# Patient Record
Sex: MALE | Race: WHITE | URBAN - METROPOLITAN AREA
[De-identification: names, ages, dates, MRNs, and addresses within clinical notes are randomized per-mention and may not be internally consistent; named-entity substitution may affect disease eponyms.]

---

## 2017-01-30 ENCOUNTER — TELEPHONE (OUTPATIENT)
Dept: CARDIOLOGY CLINIC | Age: 57
End: 2017-01-30

## 2017-01-30 NOTE — TELEPHONE ENCOUNTER
Called patient to verify [facility] he went to. He went to the one in [place].  Faxed them STAT records request.     Future Appointments  Date Time Provider Lane [place]   2/3/2017 2:40 PM Moises Franz  [address]      Received ekg and vitals

## 2017-02-03 ENCOUNTER — OFFICE VISIT (OUTPATIENT)
Dept: CARDIOLOGY CLINIC | Age: 57
End: 2017-02-03

## 2017-02-03 VITALS
HEIGHT: 70 IN | RESPIRATION RATE: 12 BRPM | OXYGEN SATURATION: 98 % | SYSTOLIC BLOOD PRESSURE: 140 MMHG | WEIGHT: 158.6 LBS | HEART RATE: 64 BPM | BODY MASS INDEX: 22.71 KG/M2 | DIASTOLIC BLOOD PRESSURE: 70 MMHG

## 2017-02-03 DIAGNOSIS — R03.0 PRE-HYPERTENSION: Primary | ICD-10-CM

## 2017-02-03 DIAGNOSIS — R07.89 OTHER CHEST PAIN: ICD-10-CM

## 2017-02-03 DIAGNOSIS — R00.1 BRADYCARDIA: ICD-10-CM

## 2017-02-03 PROBLEM — R07.9 CHEST PAIN: Status: ACTIVE | Noted: 2017-02-03

## 2017-02-03 RX ORDER — ASPIRIN 81 MG/1
TABLET ORAL DAILY
COMMUNITY

## 2017-02-03 RX ORDER — OMEPRAZOLE 20 MG/1
20 CAPSULE, DELAYED RELEASE ORAL DAILY
COMMUNITY

## 2017-02-03 NOTE — PROGRESS NOTES
HISTORY OF PRESENT ILLNESS  Mauricio Joy is a 56 y.o. male     SUMMARY:   Problem List  Date Reviewed: 2/3/2017          Codes Class Noted    Chest pain ICD-10-CM: R07.9  ICD-9-CM: 786.50  2/3/2017        Bradycardia ICD-10-CM: R00.1  ICD-9-CM: 427.89  2/3/2017        Pre-hypertension ICD-10-CM: R03.0  ICD-9-CM: 796.2  2/3/2017              No current outpatient prescriptions on file prior to visit. No current facility-administered medications on file prior to visit. CARDIOLOGY STUDIES TO DATE:  none  Chief Complaint   Patient presents with    Chest Pain (Angina)     patient 1st last week. States shoulder and left hand pain. Denies further chest pain/shortness of breath/swelling.  Slow Heart Rate     HPI :  Mr. Lizzy Hurst is a 56year old referred from [facility] for cardiac evaluation. On last Wednesday he was sitting at his desk at work when he had the sudden onset of left shoulder pain followed by some numbness in his left hand, all of which lasted less than five minutes, but he got concerned enough that he drove himself over to [facility] where he had a negative evaluation. His EKG at that point showed sinus bradycardia but was otherwise normal. He did not notice any other symptoms at the time of this occurrence, and he has never had anything like this before, though he has noticed that occasionally when he tries to open a jar with his right hand he will get some shooting pain in his hand and up his right forearm. He is active but gets no regular exercise. There is no history of hypertension or diabetes, he says his cholesterol has been okay, he has never smoked, and family history is negative for premature coronary disease. CARDIAC ROS:   negative for dyspnea, palpitations, syncope, orthopnea, paroxysmal nocturnal dyspnea, exertional chest pressure/discomfort, claudication, lower extremity edema    No family history on file.     No past medical history on file.    GENERAL ROS:  A comprehensive review of systems was negative except for sleep disturbance    Visit Vitals    /70 (BP 1 Location: Right arm, BP Patient Position: Sitting)    Pulse 64    Resp 12    Ht 5' 10\" (1.778 m)    Wt 158 lb 9.6 oz (71.9 kg)    SpO2 98%    BMI 22.76 kg/m2       Wt Readings from Last 3 Encounters:   02/03/17 158 lb 9.6 oz (71.9 kg)            BP Readings from Last 3 Encounters:   02/03/17 140/70       PHYSICAL EXAM  General appearance: alert, cooperative, no distress, appears stated age  Neurologic: Alert and oriented X 3  Neck: supple, symmetrical, trachea midline, no adenopathy, no carotid bruit and no JVD  Lungs: clear to auscultation bilaterally  Heart: regular rate and rhythm, S1, S2 normal, no murmur, click, rub or gallop  Abdomen: soft, non-tender. Bowel sounds normal. No masses,  no organomegaly  Extremities: extremities normal, atraumatic, no cyanosis or edema  Pulses: 2+ and symmetric      ASSESSMENT  Mr. Melodie Perry symptoms are not consistent with a cardiac etiology, and he and I talked about that at great length. I suspect it is some sort of nerve impingement of some sort. He has really no cardiac risk factors, his exam is normal, his EKG is normal, and at this point he needs no further cardiac evaluation. I strongly encouraged him to start a light exercise program for a number of different reasons. We talked about symptoms that would prompt an urgent return visit here or a call to 911. He is due to see his primary care in the next month or so for a complete physical including a lipid profile, and I told him he could forward that to us if he wished, and we would take a look at it.        current treatment plan is effective, no change in therapy  lab results and schedule of future lab studies reviewed with patient  reviewed diet, exercise and weight control    Encounter Diagnoses   Name Primary?     Pre-hypertension Yes    Other chest pain     Bradycardia Orders Placed This Encounter    omeprazole (PRILOSEC) 20 mg capsule    aspirin delayed-release 81 mg tablet       Follow-up Disposition:  Return if symptoms worsen or fail to improve.     Bre Eng MD  2/3/2017

## 2017-02-03 NOTE — MR AVS SNAPSHOT
Visit Information Date & Time Provider Department Dept. Phone Encounter #  
 2/3/2017  2:40 PM Rocael Theodore MD [facility] [place] [phone] [id number] Follow-up Instructions Return if symptoms worsen or fail to improve. Upcoming Health Maintenance Date Due Hepatitis C Screening 1960 DTaP/Tdap/Td series (1 - Tdap) 1/28/1983 FOBT Q 1 YEAR AGE 50-75 1/28/2012 INFLUENZA AGE 9 TO ADULT 8/1/2016 Allergies as of 2/3/2017  Review Complete On: 2/3/2017 By: Rocael hTeodore MD  
 Not on File Current Immunizations  Never Reviewed No immunizations on file. Not reviewed this visit You Were Diagnosed With   
  
 Codes Comments Pre-hypertension    -  Primary ICD-10-CM: R03.0 ICD-9-CM: 796.2 Other chest pain     ICD-10-CM: R07.89 ICD-9-CM: 786.59 Bradycardia     ICD-10-CM: R00.1 ICD-9-CM: 427.89 Vitals BP Pulse Resp Height(growth percentile) Weight(growth percentile) SpO2  
 140/70 (BP 1 Location: Right arm, BP Patient Position: Sitting) 64 12 5' 10\" (1.778 m) 158 lb 9.6 oz (71.9 kg) 98% BMI Smoking Status 22.76 kg/m2 Never Smoker Vitals History BMI and BSA Data Body Mass Index Body Surface Area  
 22.76 kg/m 2 1.88 m 2 Preferred Pharmacy Pharmacy Name Phone [id number] [place] [phone] Your Updated Medication List  
  
   
This list is accurate as of: 2/3/17  3:19 PM.  Always use your most recent med list.  
  
  
  
  
 aspirin delayed-release 81 mg tablet Take  by mouth daily. omeprazole 20 mg capsule Commonly known as:  PRILOSEC Take 20 mg by mouth daily. Follow-up Instructions Return if symptoms worsen or fail to improve. Introducing [facility]!    
 [facility] introduces [employer] patient portal. Now you can access parts of your medical record, email your doctor's office, and request medication refills online. 1. In your internet browser, go to [email or web address] 2. Click on the First Time User? Click Here link in the Sign In box. You will see the New Member Sign Up page. 3. Enter your [employer] Access Code exactly as it appears below. You will not need to use this code after youve completed the sign-up process. If you do not sign up before the expiration date, you must request a new code. · [employer] Access Code: [facility] Expires: 5/4/2017  2:41 PM 
 
4. Enter the last four digits of your Social Security Number (xxxx) and Date of Birth (mm/dd/yyyy) as indicated and click Submit. You will be taken to the next sign-up page. 5. Create a [employer] ID. This will be your [employer] login ID and cannot be changed, so think of one that is secure and easy to remember. 6. Create a [employer] password. You can change your password at any time. 7. Enter your Password Reset Question and Answer. This can be used at a later time if you forget your password. 8. Enter your e-mail address. You will receive e-mail notification when new information is available in [address] 9. Click Sign Up. You can now view and download portions of your medical record. 10. Click the Download Summary menu link to download a portable copy of your medical information. If you have questions, please visit the Frequently Asked Questions section of the [employer] website. Remember, [employer] is NOT to be used for urgent needs. For medical emergencies, dial 911. Now available from your iPhone and Android! Please provide this summary of care documentation to your next provider. Your primary care clinician is listed as Camilla Valle. If you have any questions after today's visit, please call [phone].

## 2017-02-03 NOTE — PROGRESS NOTES
Chief Complaint   Patient presents with    Chest Pain (Angina)     patient 1st last week. States shoulder and left hand pain. Denies further chest pain/shortness of breath/swelling.       Slow Heart Rate

## 2022-03-18 PROBLEM — R00.1 BRADYCARDIA: Status: ACTIVE | Noted: 2017-02-03

## 2022-03-19 PROBLEM — R03.0 PRE-HYPERTENSION: Status: ACTIVE | Noted: 2017-02-03

## 2022-03-19 PROBLEM — R07.9 CHEST PAIN: Status: ACTIVE | Noted: 2017-02-03

## 2023-04-14 ENCOUNTER — HOSPITAL ENCOUNTER (EMERGENCY)
Age: 63
Discharge: HOME | End: 2023-04-15
Attending: EMERGENCY MEDICINE
Payer: COMMERCIAL

## 2023-04-14 DIAGNOSIS — S82.141A CLOSED FRACTURE OF RIGHT TIBIAL PLATEAU, INITIAL ENCOUNTER: Primary | ICD-10-CM

## 2023-04-14 PROCEDURE — 99284 EMERGENCY DEPT VISIT MOD MDM: CPT

## 2023-04-15 ENCOUNTER — APPOINTMENT (OUTPATIENT)
Dept: GENERAL RADIOLOGY | Age: 63
End: 2023-04-15
Attending: EMERGENCY MEDICINE
Payer: COMMERCIAL

## 2023-04-15 VITALS
OXYGEN SATURATION: 97 % | HEART RATE: 74 BPM | DIASTOLIC BLOOD PRESSURE: 74 MMHG | BODY MASS INDEX: 24.49 KG/M2 | HEIGHT: 69 IN | WEIGHT: 165.34 LBS | TEMPERATURE: 98.3 F | SYSTOLIC BLOOD PRESSURE: 148 MMHG | RESPIRATION RATE: 20 BRPM

## 2023-04-15 PROCEDURE — 74011250636 HC RX REV CODE- 250/636: Performed by: EMERGENCY MEDICINE

## 2023-04-15 PROCEDURE — 96372 THER/PROPH/DIAG INJ SC/IM: CPT

## 2023-04-15 PROCEDURE — 74011250637 HC RX REV CODE- 250/637: Performed by: EMERGENCY MEDICINE

## 2023-04-15 PROCEDURE — 73562 X-RAY EXAM OF KNEE 3: CPT

## 2023-04-15 RX ORDER — OXYCODONE AND ACETAMINOPHEN 5; 325 MG/1; MG/1
1 TABLET ORAL ONCE
Status: COMPLETED | OUTPATIENT
Start: 2023-04-15 | End: 2023-04-15

## 2023-04-15 RX ORDER — OXYCODONE AND ACETAMINOPHEN 5; 325 MG/1; MG/1
1 TABLET ORAL
Qty: 12 TABLET | Refills: 0 | Status: SHIPPED | OUTPATIENT
Start: 2023-04-15 | End: 2023-04-18

## 2023-04-15 RX ORDER — KETOROLAC TROMETHAMINE 30 MG/ML
30 INJECTION, SOLUTION INTRAMUSCULAR; INTRAVENOUS ONCE
Status: COMPLETED | OUTPATIENT
Start: 2023-04-15 | End: 2023-04-15

## 2023-04-15 RX ADMIN — OXYCODONE HYDROCHLORIDE AND ACETAMINOPHEN 1 TABLET: 5; 325 TABLET ORAL at 01:35

## 2023-04-15 RX ADMIN — KETOROLAC TROMETHAMINE 30 MG: 30 INJECTION, SOLUTION INTRAMUSCULAR; INTRAVENOUS at 01:35

## 2023-04-18 ENCOUNTER — OFFICE VISIT (OUTPATIENT)
Dept: ORTHOPEDIC SURGERY | Age: 63
End: 2023-04-18

## 2023-04-18 DIAGNOSIS — S82.121A CLOSED FRACTURE OF LATERAL PORTION OF RIGHT TIBIAL PLATEAU, INITIAL ENCOUNTER: Primary | ICD-10-CM

## 2023-04-18 NOTE — PROGRESS NOTES
Galilea Dave (: 1960) is a 66 y.o. male patient here for evaluation of the following chief complaint(s):  No chief complaint on file. ASSESSMENT/PLAN:  Below is the assessment and plan developed based on review of pertinent history, physical exam, labs, studies, and medications. 1. Closed fracture of lateral portion of right tibial plateau, initial encounter      59-year-old male with a right knee nondisplaced lateral tibial plateau fracture. X-rays were reviewed with him in detail today and his questions were answered to his satisfaction. He will remain nonweightbearing for the time being. I advised him he can work on range of motion of the joint but must wear the knee immobilizer when up moving around. He will follow-up with us in 3 weeks for repeat x-rays. No follow-ups on file. SUBJECTIVE/OBJECTIVE:  Galilea Dave (: 1960) is a 66 y.o. male who presents today for the following:  No chief complaint on file. HPI   59-year-old male presents today complaining of right knee pain for the past 4 days. He states he was playing with his dog when the dog ran into the lateral aspect of his knee. He felt immediate pain at that time and was unable to bear weight without increasing pain. He did go to the ER where they diagnosed him with a nondisplaced lateral tibial plateau fracture. They gave him crutches and an immobilizer. He has been consistent about being nonweightbearing since the injury. He has been taking occasional Percocet and anti-inflammatories for pain. IMAGING:  XR Results (most recent):  Results from Hospital Encounter encounter on 23    XR KNEE RT 3 V    Narrative  EXAM: XR KNEE RT 3 V    INDICATION: pain. COMPARISON: None. FINDINGS: Three views of the right knee demonstrate a nondisplaced lateral  tibial plateau fracture. No other acute fracture or dislocation is seen.  There  is a large knee joint effusion. Impression  Nondisplaced lateral tibial plateau fracture with associated knee  joint effusion. MRI Results (most recent):  No results found for this or any previous visit. No Known Allergies    Current Outpatient Medications   Medication Sig    oxyCODONE-acetaminophen (Percocet) 5-325 mg per tablet Take 1 Tablet by mouth every six (6) hours as needed for Pain for up to 3 days. Max Daily Amount: 4 Tablets. omeprazole (PRILOSEC) 20 mg capsule Take 20 mg by mouth daily. No current facility-administered medications for this visit. History reviewed. No pertinent past medical history. Past Surgical History:   Procedure Laterality Date    HX APPENDECTOMY         Family History   Problem Relation Age of Onset    Cancer Father     Heart Disease Neg Hx         Social History     Tobacco Use    Smoking status: Never    Smokeless tobacco: Not on file   Substance Use Topics    Alcohol use: Not on file        Review of Systems     No flowsheet data found. Vitals: There were no vitals taken for this visit. There is no height or weight on file to calculate BMI. Physical Exam    Upon examination of the right knee, the patient is tender to palpation along the lateral joint line. There is an effusion present. They have no crepitus of the patellofemoral joint with range of motion and no discomfort with patella grind testing. The patient has no discomfort with Travis´s maneuvers, and the knee is stable. They have limited range of motion due to pain. They have 5/5 strength, and are neurovascularly intact distally. There is no erythema, warmth or skin lesions present. Dr. Morris Lyons was available for immediate consult during this encounter. An electronic signature was used to authenticate this note.   -- Quique Quintero PA-C

## 2023-05-17 RX ORDER — OMEPRAZOLE 20 MG/1
20 CAPSULE, DELAYED RELEASE ORAL DAILY
COMMUNITY

## 2023-10-31 ENCOUNTER — TRANSCRIBE ORDERS (OUTPATIENT)
Facility: HOSPITAL | Age: 63
End: 2023-10-31

## 2023-10-31 DIAGNOSIS — N64.4 PAIN OF BREAST: Primary | ICD-10-CM

## 2023-11-27 ENCOUNTER — HOSPITAL ENCOUNTER (OUTPATIENT)
Facility: HOSPITAL | Age: 63
Discharge: HOME | End: 2023-11-30
Payer: COMMERCIAL

## 2023-11-27 VITALS — BODY MASS INDEX: 23.73 KG/M2 | WEIGHT: 163 LBS

## 2023-11-27 DIAGNOSIS — N64.4 PAIN OF BREAST: ICD-10-CM

## 2023-11-27 PROCEDURE — 77066 DX MAMMO INCL CAD BI: CPT
